# Patient Record
Sex: MALE | Race: WHITE
[De-identification: names, ages, dates, MRNs, and addresses within clinical notes are randomized per-mention and may not be internally consistent; named-entity substitution may affect disease eponyms.]

---

## 2019-07-18 ENCOUNTER — HOSPITAL ENCOUNTER (EMERGENCY)
Dept: HOSPITAL 97 - ER | Age: 49
Discharge: HOME | End: 2019-07-18
Payer: COMMERCIAL

## 2019-07-18 VITALS — DIASTOLIC BLOOD PRESSURE: 94 MMHG | SYSTOLIC BLOOD PRESSURE: 137 MMHG | OXYGEN SATURATION: 97 %

## 2019-07-18 VITALS — TEMPERATURE: 98.4 F

## 2019-07-18 DIAGNOSIS — R19.7: Primary | ICD-10-CM

## 2019-07-18 DIAGNOSIS — R10.30: ICD-10-CM

## 2019-07-18 DIAGNOSIS — N20.0: ICD-10-CM

## 2019-07-18 LAB
ALBUMIN SERPL BCP-MCNC: 3.9 G/DL (ref 3.4–5)
ALP SERPL-CCNC: 81 U/L (ref 45–117)
ALT SERPL W P-5'-P-CCNC: 50 U/L (ref 12–78)
AST SERPL W P-5'-P-CCNC: 21 U/L (ref 15–37)
BUN BLD-MCNC: 16 MG/DL (ref 7–18)
GLUCOSE SERPLBLD-MCNC: 96 MG/DL (ref 74–106)
HCT VFR BLD CALC: 47.3 % (ref 39.6–49)
LIPASE SERPL-CCNC: 133 U/L (ref 73–393)
LYMPHOCYTES # SPEC AUTO: 0.8 K/UL (ref 0.7–4.9)
PMV BLD: 7.5 FL (ref 7.6–11.3)
POTASSIUM SERPL-SCNC: 3.6 MMOL/L (ref 3.5–5.1)
RBC # BLD: 4.95 M/UL (ref 4.33–5.43)

## 2019-07-18 PROCEDURE — 74176 CT ABD & PELVIS W/O CONTRAST: CPT

## 2019-07-18 PROCEDURE — 99284 EMERGENCY DEPT VISIT MOD MDM: CPT

## 2019-07-18 PROCEDURE — 81003 URINALYSIS AUTO W/O SCOPE: CPT

## 2019-07-18 PROCEDURE — 36415 COLL VENOUS BLD VENIPUNCTURE: CPT

## 2019-07-18 PROCEDURE — 96360 HYDRATION IV INFUSION INIT: CPT

## 2019-07-18 PROCEDURE — 85025 COMPLETE CBC W/AUTO DIFF WBC: CPT

## 2019-07-18 PROCEDURE — 80076 HEPATIC FUNCTION PANEL: CPT

## 2019-07-18 PROCEDURE — 83690 ASSAY OF LIPASE: CPT

## 2019-07-18 PROCEDURE — 80048 BASIC METABOLIC PNL TOTAL CA: CPT

## 2019-07-18 NOTE — RAD REPORT
EXAM DESCRIPTION:  CT - Abdomen   Pelvis Wo Contrast - 7/18/2019 5:16 pm

 

CLINICAL HISTORY:  Abdominal  pain vomiting

 

COMPARISON:   None

 

TECHNIQUE:  Computed axial tomography of the abdomen and pelvis was obtained. IV and oral contrast we
re not requested.

 

All CT scans are performed using dose optimization technique as appropriate and may include automated
 exposure control or mA/KV adjustment according to patient size.

 

FINDINGS:   The evaluation of solid organs, vessels and bowel is limited secondary to the lack of con
trast administration.

 

The liver, spleen, pancreas, adrenals and right kidney appear grossly normal.

 

A 2 millimeter nonobstructing left renal calculus

 

The appendix is normal. There is no evidence of diverticulitis.

 

Small inguinal hernias contain fat. Small umbilical hernia

 

IMPRESSION:  Small nonobstructing left renal calculus

## 2019-07-18 NOTE — ER
Nurse's Notes                                                                                     

 [facility]                                                                 

Name: Baljit Regan                                                                               

Age: 49 yrs                                                                                       

Sex: Male                                                                                         

: 1970                                                                                   

MRN: [id number]                                                                                   

Arrival Date: 2019                                                                          

Time: 15:56                                                                                       

Account#: [id number]                                                                            

Bed 23                                                                                            

Private MD:                                                                                       

Diagnosis: Lower abdominal pain, unspecified;Diarrhea, unspecified                                

                                                                                                  

Presentation:                                                                                     

                                                                                             

16:01 Presenting complaint: Upper abdominal pain x 2 weeks, N/V and dizziness 2 days. Denies  hb  

      fever. Transition of care: patient was not received from another setting of care. Onset     

      of symptoms is unknown. Risk Assessment: Do you want to hurt yourself or someone else?      

      Patient reports no desire to harm self or others. Care prior to arrival: None.              

16:01 Method Of Arrival: Ambulatory                                                           hb  

16:01 Acuity: THUAN 3                                                                           hb  

16:35 Initial Sepsis Screen: Does the patient meet any 2 criteria? No. Patient's initial      mg2 

      sepsis screen is negative. Does the patient have a suspected source of infection? No.       

      Patient's initial sepsis screen is negative.                                                

                                                                                                  

Historical:                                                                                       

- Allergies:                                                                                      

16:03 No Known Allergies;                                                                     hb  

- Home Meds:                                                                                      

16:03 multivitamin with minerals oral tab [Active];                                           hb  

- PMHx:                                                                                           

16:03 None;                                                                                   hb  

- PSHx:                                                                                           

16:03 Leg - Right;                                                                            hb  

                                                                                                  

- Immunization history:: Adult Immunizations up to date.                                          

- Social history:: Smoking status: Patient/guardian denies using tobacco.                         

- Ebola Screening: : No symptoms or risks identified at this time.                                

                                                                                                  

                                                                                                  

Screenin:35 Abuse screen: Denies threats or abuse. Denies injuries from another. Nutritional        mg2 

      screening: No deficits noted. Tuberculosis screening: No symptoms or risk factors           

      identified. Fall Risk IV access (20 points).                                                

                                                                                                  

Assessment:                                                                                       

16:33 General: Appears in no apparent distress. comfortable, Behavior is calm, cooperative.   mg2 

      Pain: Complains of pain in abdomen Pain does not radiate. Pain currently is 7 out of 10     

      on a pain scale. Quality of pain is described as aching, Pain began gradually, Is           

      intermittent. Neuro: Level of Consciousness is awake, alert, obeys commands, Oriented       

      to person, place, time, situation. Cardiovascular: Capillary refill < 3 seconds             

      Patient's skin is warm and dry. Respiratory: Airway is patent Respiratory effort is         

      even, unlabored, Respiratory pattern is regular, symmetrical. GI: Bowel sounds present      

      X 4 quads. Abd is soft Abdomen is tender to palpation. GI: Reports lower abdominal          

      pain, upper abdominal pain, vomiting. : Urine is see urine dip. EENT: No signs and/or     

      symptoms were reported regarding the EENT system. Derm: Skin is intact, is healthy with     

      good turgor, Skin is pink, warm \T\ dry. normal. Musculoskeletal: Circulation, motion,      

      and sensation intact. Capillary refill < 3 seconds.                                         

18:23 Reassessment: patient up for discharge after completing iv fluid.                       mg2 

                                                                                                  

Vital Signs:                                                                                      

16:02  / 93; Pulse 82; Resp 16; Temp 98.4; Pulse Ox 100% on R/A; Weight 113.4 kg;       hb  

      Height 5 ft. 8 in. (172.72 cm); Pain 8/10;                                                  

17:44  / 94; Pulse 63; Resp 18; Pulse Ox 97% on R/A;                                    mg2 

16:02 Body Mass Index 38.01 (113.40 kg, 172.72 cm)                                            hb  

                                                                                                  

ED Course:                                                                                        

15:56 Patient arrived in ED.                                                                  as  

16:02 Triage completed.                                                                       hb  

16:02 Arm band placed on.                                                                     hb  

16:13 Hieu Trimble, RN is Primary Nurse.                                                  mg2 

16:17 Christiano Rosenthal MD is Attending Physician.                                              gs  

16:35 Patient has correct armband on for positive identification. Pulse ox on. NIBP on. Door  mg2 

      closed. Warm blanket given.                                                                 

16:35 No provider procedures requiring assistance completed.                                  mg2 

16:38 Initial lab(s) drawn, by me, sent to lab. Inserted saline lock: 20 gauge in right       lt1 

      antecubital area, using aseptic technique.                                                  

17:18 CT Abd/Pelvis - Without Contrast In Process Unspecified.                                EDMS

17:57 Tim Del Toro MD is Referral Physician.                                              gs  

18:50 IV discontinued, intact, bleeding controlled, No redness/swelling at site. Pressure     mg2 

      dressing applied.                                                                           

                                                                                                  

Administered Medications:                                                                         

16:58 Drug: NS 0.9% 1000 ml Route: IV; Rate: 1 bolus; Site: right antecubital;                mg2 

17:58 Follow up: Response: No adverse reaction; IV Status: Completed infusion; IV Intake:     mg2 

      1000ml                                                                                      

                                                                                                  

                                                                                                  

Intake:                                                                                           

17:58 IV: 1000ml; Total: 1000ml.                                                              mg2 

                                                                                                  

Outcome:                                                                                          

17:57 Discharge ordered by MD.                                                                gs  

18:50 Discharged to home ambulatory.                                                          mg2 

18:50 Condition: stable                                                                           

18:50 Discharge instructions given to patient, Instructed on discharge instructions, follow       

      up and referral plans. medication usage, Demonstrated understanding of instructions,        

      follow-up care, medications, Prescriptions given X 1.                                       

18:51 Patient left the ED.                                                                    mg2 

                                                                                                  

Signatures:                                                                                       

Dispatcher [employer]                           Dagmar Monroy Heather, RN RN                                                      

Christiano Rosenthal MD MD                                                      

Hieu Trimble RN RN   [address], [facility]                                                  

                                                                                                  

**************************************************************************************************

## 2019-07-18 NOTE — EDPHYS
Physician Documentation                                                                           

 [facility]                                                                 

Name: Baljit Regan                                                                               

Age: 49 yrs                                                                                       

Sex: Male                                                                                         

: 1970                                                                                   

MRN: [id number]                                                                                   

Arrival Date: 2019                                                                          

Time: 15:56                                                                                       

Account#: [id number]                                                                            

Bed 23                                                                                            

Private MD:                                                                                       

ED Physician Christiano Rosenthal                                                                       

Historical:                                                                                       

- Allergies:                                                                                      

                                                                                             

16:03 No Known Allergies;                                                                     hb  

- Home Meds:                                                                                      

16:03 multivitamin with minerals oral tab [Active];                                           hb  

- PMHx:                                                                                           

16:03 None;                                                                                   hb  

- PSHx:                                                                                           

16:03 Leg - Right;                                                                            hb  

                                                                                                  

- Immunization history:: Adult Immunizations up to date.                                          

- Social history:: Smoking status: Patient/guardian denies using tobacco.                         

- Ebola Screening: : No symptoms or risks identified at this time.                                

                                                                                                  

                                                                                                  

Vital Signs:                                                                                      

16:02  / 93; Pulse 82; Resp 16; Temp 98.4; Pulse Ox 100% on R/A; Weight 113.4 kg;       hb  

      Height 5 ft. 8 in. (172.72 cm); Pain 8/10;                                                  

17:44  / 94; Pulse 63; Resp 18; Pulse Ox 97% on R/A;                                    mg2 

16:02 Body Mass Index 38.01 (113.40 kg, 172.72 cm)                                            hb  

                                                                                                  

MDM:                                                                                              

16:36 Patient medically screened.                                                             gs  

                                                                                                  

                                                                                             

16:19 Order name: Basic Metabolic Panel; Complete Time: 17:14                                 mg2 

                                                                                             

16:19 Order name: CBC with Diff; Complete Time: 17:14                                         mg2 

18                                                                                             

16:19 Order name: Creatinine for Radiology; Complete Time: 17:14                              mg2 

                                                                                             

16:19 Order name: Hepatic Function; Complete Time: 17:14                                      mg2 

18                                                                                             

16:19 Order name: Lipase; Complete Time: 17:14                                                mg2 

                                                                                             

16:31 Order name: Urine Dipstick--Ancillary (enter results); Complete Time: 17:14             eb  

                                                                                             

16:19 Order name: IV Saline Lock; Complete Time: 16:31                                        mg2 

                                                                                             

16:19 Order name: Labs collected and sent; Complete Time: 16:31                               mg2 

                                                                                             

16:32 Order name: Urine Dipstick-Ancillary (obtain specimen); Complete Time: 16:32            mg2 

                                                                                             

16:53 Order name: CT Abd/Pelvis - Without Contrast; Complete Time: 17:53                      gs  

                                                                                                  

Administered Medications:                                                                         

16:58 Drug: NS 0.9% 1000 ml Route: IV; Rate: 1 bolus; Site: right antecubital;                mg2 

17:58 Follow up: Response: No adverse reaction; IV Status: Completed infusion; IV Intake:     mg2 

      1000ml                                                                                      

                                                                                                  

                                                                                                  

Disposition:                                                                                      

19 17:57 Discharged to Home. Impression: Lower abdominal pain, unspecified, Diarrhea,       

  unspecified.                                                                                    

- Condition is Stable.                                                                            

- Discharge Instructions: Abdominal Pain, Adult, Diarrhea, Adult, Nausea and Vomiting,            

  Adult.                                                                                          

- Prescriptions for Zofran 4 mg Oral Tablet - take 1 tablet by ORAL route every 12                

  hours As needed; 6 tablet.                                                                      

- Work release form, Medication Reconciliation Form, Thank You Letter, Antibiotic                 

  Education, Prescription Opioid Use form.                                                        

- Follow up: Private Physician; When: 2 - 3 days; Reason: Re-evaluation by your                   

  physician. Follow up: Tim Del Toro MD; When: 2 - 3 days; Reason: Re-evaluation by           

  your physician.                                                                                 

                                                                                                  

                                                                                                  

                                                                                                  

Signatures:                                                                                       

Dispatcher [employer]                           EDMS                                                 

Radha Ga RN RN                                                      

Christiano Rosenthal MD MD                                                      

Hieu Trimble RN RN   mg2                                                  

                                                                                                  

Corrections: (The following items were deleted from the chart)                                    

18:51 17:57 2019 17:57 Discharged to Home. Impression: Lower abdominal pain,            mg2 

      unspecified; Diarrhea, unspecified. Condition is Stable. Forms are Medication               

      Reconciliation Form, Thank You Letter, Antibiotic Education, Prescription Opioid Use.       

      Follow up: Private Physician; When: 2 - 3 days; Reason: Re-evaluation by your               

      physician. Follow up: Tim Del Toro; When: 2 - 3 days; Reason: Re-evaluation by your       

      physician.                                                                                

                                                                                                  

**************************************************************************************************